# Patient Record
Sex: FEMALE | Race: WHITE | NOT HISPANIC OR LATINO
[De-identification: names, ages, dates, MRNs, and addresses within clinical notes are randomized per-mention and may not be internally consistent; named-entity substitution may affect disease eponyms.]

---
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
## 2021-05-14 ENCOUNTER — APPOINTMENT (OUTPATIENT)
Dept: PULMONOLOGY | Facility: CLINIC | Age: 21
End: 2021-05-14
Payer: COMMERCIAL

## 2021-05-14 VITALS
HEART RATE: 76 BPM | DIASTOLIC BLOOD PRESSURE: 75 MMHG | OXYGEN SATURATION: 97 % | HEIGHT: 68 IN | SYSTOLIC BLOOD PRESSURE: 120 MMHG | WEIGHT: 140 LBS | BODY MASS INDEX: 21.22 KG/M2 | TEMPERATURE: 98.1 F

## 2021-05-14 DIAGNOSIS — F51.04 PSYCHOPHYSIOLOGIC INSOMNIA: ICD-10-CM

## 2021-05-14 DIAGNOSIS — U07.1 COVID-19: ICD-10-CM

## 2021-05-14 DIAGNOSIS — Z78.9 OTHER SPECIFIED HEALTH STATUS: ICD-10-CM

## 2021-05-14 PROCEDURE — 99072 ADDL SUPL MATRL&STAF TM PHE: CPT

## 2021-05-14 PROCEDURE — 99204 OFFICE O/P NEW MOD 45 MIN: CPT

## 2021-05-14 RX ORDER — ZOLPIDEM TARTRATE 5 MG/1
5 TABLET ORAL
Qty: 30 | Refills: 0 | Status: ACTIVE | COMMUNITY
Start: 2021-05-14 | End: 1900-01-01

## 2021-05-16 NOTE — ASSESSMENT
[FreeTextEntry1] : I discussed the condition details with the patient and the mother.  It seems that the patient had marijuana toxicity but she stopped completely about 2 weeks ago.  The patient ist experiencing withdrawal from marijuana.  I discussed the symptoms which is classical for marijuana withdrawal.  Patient stopped about 2 weeks ago cold turkey.  Her symptoms is suggestive of of marijuana withdrawal and is slightly improving.  Patient main concern at this point is the nausea and insomnia.\par \par At this point the patient does not want any other medication other than for the GI symptoms and insomnia.\par \par I started the patient on Ambien to use as needed basis for the insomnia.  Also the patient started on omeprazole for the GI symptoms.  She is to continue on the antinausea medication.\par \par The patient will follow up locally with a psychiatrist to address her anxiety.\par \par The patient was instructed about the change in the quality of her life and starting resuming her sports and activity.\par \par I discussed with the patient her diet habits and to start with bland diet and advance as tolerated.  Patient will update me on her condition in 1 week

## 2021-05-16 NOTE — HISTORY OF PRESENT ILLNESS
[FreeTextEntry8] : The patient is doing little better.  Patient was consuming a lot of marijuana that about 2 weeks ago she stopped go turkey.  Sound like she has some symptoms of cough toxicity from it but stopped it 2 weeks ago since then she has been having problem with being irritable.  And ability to sleep, loss of appetite, nausea and vomiting.  Her symptoms slightly better but she still not able to keep that much fluid in [Parent] : parent

## 2021-05-16 NOTE — REVIEW OF SYSTEMS
[Fever] : no fever [Chills] : no chills [Fatigue] : fatigue [Hot Flashes] : no hot flashes [Recent Change In Weight] : ~T recent weight change [Night Sweats] : no night sweats [Negative] : Heme/Lymph

## 2021-12-01 ENCOUNTER — NON-APPOINTMENT (OUTPATIENT)
Age: 21
End: 2021-12-01